# Patient Record
Sex: FEMALE | Race: ASIAN | URBAN - METROPOLITAN AREA
[De-identification: names, ages, dates, MRNs, and addresses within clinical notes are randomized per-mention and may not be internally consistent; named-entity substitution may affect disease eponyms.]

---

## 2017-01-16 ENCOUNTER — OFFICE VISIT (OUTPATIENT)
Dept: URGENT CARE | Facility: URGENT CARE | Age: 27
End: 2017-01-16
Payer: COMMERCIAL

## 2017-01-16 VITALS — DIASTOLIC BLOOD PRESSURE: 68 MMHG | SYSTOLIC BLOOD PRESSURE: 116 MMHG | OXYGEN SATURATION: 97 % | HEART RATE: 101 BPM

## 2017-01-16 DIAGNOSIS — S61.219A FINGER LACERATION, INITIAL ENCOUNTER: Primary | ICD-10-CM

## 2017-01-16 PROCEDURE — 99213 OFFICE O/P EST LOW 20 MIN: CPT | Performed by: FAMILY MEDICINE

## 2017-01-16 NOTE — PROGRESS NOTES
SUBJECTIVE:  Annette Davis is a 26 year old female complains of laceration of her right thumb. She did this last evening using a mandolin. Did not take ibuprofen for minimal discomfort that she is having. Bleeding is stopped    OBJECTIVE:  /68 mmHg  Pulse 101  SpO2 97%  Exam; examination shows a complete avulsion of tissue with some removal of the distal end of the nail right thumb into the pulp. No fat is evident underneath the skin.     ASSESSMENT:  1. Laceration with complete avulsion of tissue. I suspect that this will heal well with no evidence of infection not to substantial amount of tissue that was avulsed.     PLAN:  1. Gelfoam was placed on the wound she will do daily dressing changes and have this reevaluated in 1 week.  2. Given instructions on what to look for as far as infection is concerned

## 2017-01-16 NOTE — NURSING NOTE
Chief Complaint   Patient presents with     Urgent Care     Laceration     right side thumb cooking yesterday at 6:30-7pm.  pt reports last tetanus was w/in 10 years       Initial /68 mmHg  Pulse 101  SpO2 97% There is no height or weight on file to calculate BMI.  BP completed using cuff size: stewart Brumfield CMA                                1/16/2017 11:43 AM

## 2017-01-23 ENCOUNTER — OFFICE VISIT (OUTPATIENT)
Dept: URGENT CARE | Facility: URGENT CARE | Age: 27
End: 2017-01-23
Payer: COMMERCIAL

## 2017-01-23 VITALS
SYSTOLIC BLOOD PRESSURE: 98 MMHG | OXYGEN SATURATION: 98 % | DIASTOLIC BLOOD PRESSURE: 70 MMHG | HEART RATE: 80 BPM | TEMPERATURE: 98.1 F | WEIGHT: 143 LBS

## 2017-01-23 DIAGNOSIS — S61.219D FINGER LACERATION, SUBSEQUENT ENCOUNTER: Primary | ICD-10-CM

## 2017-01-23 PROCEDURE — 99212 OFFICE O/P EST SF 10 MIN: CPT | Performed by: FAMILY MEDICINE

## 2017-01-23 NOTE — NURSING NOTE
Chief Complaint   Patient presents with     Urgent Care     Laceration     Finger laceration 1 week ago. Dr. Zuleta recommended she come back to  in 1 week for f/u       Initial BP 98/70 mmHg  Pulse 80  Temp(Src) 98.1  F (36.7  C) (Tympanic)  Wt 143 lb (64.864 kg)  SpO2 98% There is no height on file to calculate BMI.  BP completed using cuff size: AR Clifford

## 2017-01-24 NOTE — PROGRESS NOTES
SUBJECTIVE:     Chief Complaint   Patient presents with     Urgent Care     Laceration     Finger laceration 1 week ago. Dr. Zuleta recommended she come back to  in 1 week for f/u     Annette Davis is a 26 year old female who presents to the clinic with a laceration on the right index finger sustained 1 week(s) ago.  This is a accidental injury.    Mechanism of injury: knife.    Associated symptoms: Denies numbness, weakness, or loss of function  Last tetanus booster within 10 years: yes    EXAM:   The patient appears today in alert,no apparent distress distress  VITALS: BP 98/70 mmHg  Pulse 80  Temp(Src) 98.1  F (36.7  C) (Tympanic)  Wt 143 lb (64.864 kg)  SpO2 98%    Size of laceration: 1 centimeters, R index finger  Characteristics of the laceration: covered by surgifoam  Tendon function intact: yes  Sensation to light touch intact: not asked  Pulses intact: yes  Picture included in patient's chart: no    Assessment:  Finger laceration, L    PLAN:  Ok to keep uncovered for now.   Gentle soap and water    See back in one week or sooner prn worsneing

## 2018-11-07 ENCOUNTER — TELEPHONE (OUTPATIENT)
Dept: OBGYN | Facility: CLINIC | Age: 28
End: 2018-11-07

## 2018-11-08 NOTE — TELEPHONE ENCOUNTER
Reason for call:  Other   Patient called regarding (reason for call): appointment  Additional comments: Patient would like to set up her prenatal care. She is aware that she needs to schedule an OB intake first. This is her first pregnancy. The first day of her last period was 10/04/2018. She would eventually like to see a nurse midwife. Preferred locations are [place] and [place].    Phone number to reach patient:  Cell number on file:    Telephone Information:   Mobile [phone]       Best Time:  Any time    Can we leave a detailed message on this number?  YES     Lexie ROBERT  Central Scheduler

## 2018-11-27 ENCOUNTER — PRENATAL OFFICE VISIT (OUTPATIENT)
Dept: NURSING | Facility: CLINIC | Age: 28
End: 2018-11-27
Payer: COMMERCIAL

## 2018-11-27 VITALS
HEIGHT: 65 IN | WEIGHT: 139 LBS | SYSTOLIC BLOOD PRESSURE: 96 MMHG | DIASTOLIC BLOOD PRESSURE: 60 MMHG | BODY MASS INDEX: 23.16 KG/M2 | HEART RATE: 80 BPM

## 2018-11-27 DIAGNOSIS — Z34.01 ENCOUNTER FOR SUPERVISION OF NORMAL FIRST PREGNANCY IN FIRST TRIMESTER: Primary | ICD-10-CM

## 2018-11-27 LAB
ABO + RH BLD: NORMAL
ABO + RH BLD: NORMAL
BLD GP AB SCN SERPL QL: NORMAL
BLOOD BANK CMNT PATIENT-IMP: NORMAL
ERYTHROCYTE [DISTWIDTH] IN BLOOD BY AUTOMATED COUNT: 12.7 % (ref 10–15)
HBV SURFACE AG SERPL QL IA: NONREACTIVE
HCT VFR BLD AUTO: 36.7 % (ref 35–47)
HGB BLD-MCNC: 11.8 G/DL (ref 11.7–15.7)
HIV 1+2 AB+HIV1 P24 AG SERPL QL IA: NONREACTIVE
MCH RBC QN AUTO: 28.2 PG (ref 26.5–33)
MCHC RBC AUTO-ENTMCNC: 32.2 G/DL (ref 31.5–36.5)
MCV RBC AUTO: 88 FL (ref 78–100)
PLATELET # BLD AUTO: 254 10E9/L (ref 150–450)
RBC # BLD AUTO: 4.18 10E12/L (ref 3.8–5.2)
SPECIMEN EXP DATE BLD: NORMAL
WBC # BLD AUTO: 6.5 10E9/L (ref 4–11)

## 2018-11-27 PROCEDURE — 87389 HIV-1 AG W/HIV-1&-2 AB AG IA: CPT | Performed by: ADVANCED PRACTICE MIDWIFE

## 2018-11-27 PROCEDURE — 86901 BLOOD TYPING SEROLOGIC RH(D): CPT | Performed by: ADVANCED PRACTICE MIDWIFE

## 2018-11-27 PROCEDURE — 87340 HEPATITIS B SURFACE AG IA: CPT | Performed by: ADVANCED PRACTICE MIDWIFE

## 2018-11-27 PROCEDURE — 36415 COLL VENOUS BLD VENIPUNCTURE: CPT | Performed by: ADVANCED PRACTICE MIDWIFE

## 2018-11-27 PROCEDURE — 86850 RBC ANTIBODY SCREEN: CPT | Performed by: ADVANCED PRACTICE MIDWIFE

## 2018-11-27 PROCEDURE — 86762 RUBELLA ANTIBODY: CPT | Performed by: ADVANCED PRACTICE MIDWIFE

## 2018-11-27 PROCEDURE — 86780 TREPONEMA PALLIDUM: CPT | Performed by: ADVANCED PRACTICE MIDWIFE

## 2018-11-27 PROCEDURE — 87086 URINE CULTURE/COLONY COUNT: CPT | Performed by: ADVANCED PRACTICE MIDWIFE

## 2018-11-27 PROCEDURE — 99207 ZZC NO CHARGE NURSE ONLY: CPT

## 2018-11-27 PROCEDURE — 85027 COMPLETE CBC AUTOMATED: CPT | Performed by: ADVANCED PRACTICE MIDWIFE

## 2018-11-27 PROCEDURE — 86900 BLOOD TYPING SEROLOGIC ABO: CPT | Performed by: ADVANCED PRACTICE MIDWIFE

## 2018-11-27 RX ORDER — PRENATAL VIT/IRON FUM/FOLIC AC 27MG-0.8MG
1 TABLET ORAL DAILY
Qty: 100 TABLET | Refills: 3
Start: 2018-11-27 | End: 2018-12-17 | Stop reason: ALTCHOICE

## 2018-11-27 NOTE — PROGRESS NOTES
"Chief Complaint   Patient presents with     Prenatal Care     New Prenatal Nurse Visit   7w5d  Estimated Date of Delivery: Jul 11, 2019      Initial BP 96/60 (BP Location: Right arm, Cuff Size: Adult Regular)  Pulse 80  Ht 5' 4.5\" (1.638 m)  Wt 139 lb (63 kg)  LMP 10/04/2018  BMI 23.49 kg/m2 Estimated body mass index is 23.49 kg/(m^2) as calculated from the following:    Height as of this encounter: 5' 4.5\" (1.638 m).    Weight as of this encounter: 139 lb (63 kg).  BP completed using cuff size: regular    Patient is accompanied by . Prenatal book and folder (containing standard educational hand-outs and brochures) given to patient. Information in folder reviewed. Questions answered.     Discussed and gave written information on options available for 1st and 2nd trimester screening, CVS, amniocentesis, AFP, and QUAD screen plus optimal time-frame for testing. Brochure given on optional screening available to determine Cystic Fibrosis carrier status. Pt instructed to check with insurance regarding coverage of these optional tests. Pt advised to call back if she desires testing or has any questions or concerns.    Prenatal labs obtained. New prenatal visit scheduled on 12/17/18 with Dunia Castellanos.  Tarah Guallpa RN                 "

## 2018-11-27 NOTE — MR AVS SNAPSHOT
After Visit Summary   11/27/2018    Annette Davis    MRN: [id number]           Patient Information     Date Of Birth          1990        Visit Information        Provider Department      11/27/2018 9:30 AM EA PRENATAL NURSE [facility]        Today's Diagnoses     Encounter for supervision of normal first pregnancy in first trimester    -  1       Follow-ups after your visit        Your next 10 appointments already scheduled     Dec 04, 2018  9:00 AM CST   Ultrasound with RIUS1   [facility]    [address]  [address]  [facility] [id number] [phone]            Dec 17, 2018  6:00 PM CST   New Prenatal with APPLE Shea CNM   [facility] ([facility])    [address]  [address]  [facility] [address] [phone]              Future tests that were ordered for you today     Open Future Orders        Priority Expected Expires Ordered    US OB < 14 weeks, single,  for dating ([id number]) Routine  2/25/2019 11/27/2018            Who to contact     If you have questions or need follow up information about today's clinic visit or your schedule please contact [facility] directly at [phone].  Normal or non-critical lab and imaging results will be communicated to you by DataTorrenthart, letter or phone within 4 business days after the clinic has received the results. If you do not hear from us within 7 days, please contact the clinic through DataTorrenthart or phone. If you have a critical or abnormal lab result, we will notify you by phone as soon as possible.  Submit refill requests through Globitel or call your pharmacy and they will forward the refill request to us. Please allow 3 business days for your refill to be completed.          Additional Information About Your Visit        Globitel Information     Globitel lets you send messages to your doctor, view your test results,  "renew your prescriptions, schedule appointments and more. To sign up, go to [email or web address] . Click on \"Log in\" on the left side of the screen, which will take you to the Welcome page. Then click on \"Sign up Now\" on the right side of the page.     You will be asked to enter the access code listed below, as well as some personal information. Please follow the directions to create your username and password.     Your access code is: [email or web address]  Expires: 2019 11:47 AM     Your access code will  in 90 days. If you need help or a new code, please call your [facility] clinic or [phone].        Care EveryWhere ID     This is your Care EveryWhere ID. This could be used by other organizations to access your [facility] [place] medical records  [id number]        Your Vitals Were     Pulse Height Last Period BMI (Body Mass Index)          80 5' 4.5\" (1.638 m) 10/04/2018 23.49 kg/m2         Blood Pressure from Last 3 Encounters:   18 96/60   17 98/70   17 116/68    Weight from Last 3 Encounters:   18 139 lb (63 kg)   17 143 lb (64.9 kg)              We Performed the Following     ABO/Rh type and screen     CBC with platelets     Hepatitis B surface antigen     HIV Antigen Antibody Combo     Rubella Antibody IgG Quantitative     Treponema Abs w Reflex to RPR and Titer     Urine Culture Aerobic Bacterial          Today's Medication Changes          These changes are accurate as of 18 11:47 AM.  If you have any questions, ask your nurse or doctor.               Start taking these medicines.        Dose/Directions    pediatric multivitamin w/iron 27-0.8 MG tablet   Used for:  Encounter for supervision of normal first pregnancy in first trimester        Dose:  1 tablet   Take 1 tablet by mouth daily   Quantity:  100 tablet   Refills:  3            Where to get your medicines      Some of these will need a paper prescription and others can be bought over the counter.  Ask your nurse " if you have questions.     You don't need a prescription for these medications     pediatric multivitamin w/iron 27-0.8 MG tablet                Primary Care Provider Fax #    Physician No Ref-Primary [phone]       No address on file        Equal Access to Services     JUNO TRENT : Hadii aad ku hadjonas Jacinto, wageovannada luqadaha, qaybta kaalmada dani, dima conrad gadiel casillas. So [facility] [phone].    ATENCIÓN: Si habla español, tiene a cortes disposición servicios gratuitos de asistencia lingüística. Llame al [phone].    We comply with applicable federal civil rights laws and [place] laws. We do not discriminate on the basis of race, color, national origin, age, disability, sex, sexual orientation, or gender identity.            Thank you!     Thank you for choosing [facility]  for your care. Our goal is always to provide you with excellent care. Hearing back from our patients is one way we can continue to improve our services. Please take a few minutes to complete the written survey that you may receive in the mail after your visit with us. Thank you!             Your Updated Medication List - Protect others around you: Learn how to safely use, store and throw away your medicines at www.disposemymeds.org.          This list is accurate as of 11/27/18 11:47 AM.  Always use your most recent med list.                   Brand Name Dispense Instructions for use Diagnosis    pediatric multivitamin w/iron 27-0.8 MG tablet     100 tablet    Take 1 tablet by mouth daily    Encounter for supervision of normal first pregnancy in first trimester

## 2018-11-28 LAB
BACTERIA SPEC CULT: NO GROWTH
RUBV IGG SERPL IA-ACNC: 28 IU/ML
SPECIMEN SOURCE: NORMAL
T PALLIDUM AB SER QL: NONREACTIVE

## 2018-12-02 ENCOUNTER — MYC MEDICAL ADVICE (OUTPATIENT)
Dept: OBGYN | Facility: CLINIC | Age: 28
End: 2018-12-02

## 2018-12-03 NOTE — TELEPHONE ENCOUNTER
Hemoglobin   Date Value Ref Range Status   11/27/2018 11.8 11.7 - 15.7 g/dL Final   ]please see my chart message and advise.  Tarah Guallpa RN

## 2018-12-04 ENCOUNTER — HEALTH MAINTENANCE LETTER (OUTPATIENT)
Age: 28
End: 2018-12-04

## 2018-12-04 DIAGNOSIS — Z34.01 ENCOUNTER FOR SUPERVISION OF NORMAL FIRST PREGNANCY IN FIRST TRIMESTER: ICD-10-CM

## 2018-12-17 ENCOUNTER — PRENATAL OFFICE VISIT (OUTPATIENT)
Dept: OBGYN | Facility: CLINIC | Age: 28
End: 2018-12-17
Payer: COMMERCIAL

## 2018-12-17 VITALS — SYSTOLIC BLOOD PRESSURE: 110 MMHG | WEIGHT: 138.6 LBS | BODY MASS INDEX: 23.42 KG/M2 | DIASTOLIC BLOOD PRESSURE: 60 MMHG

## 2018-12-17 DIAGNOSIS — Z34.01 ENCOUNTER FOR SUPERVISION OF NORMAL FIRST PREGNANCY IN FIRST TRIMESTER: Primary | ICD-10-CM

## 2018-12-17 PROCEDURE — 99207 ZZC PRENATAL VISIT: CPT | Performed by: ADVANCED PRACTICE MIDWIFE

## 2018-12-17 NOTE — PROGRESS NOTES
Annette Davis is a 28 year old  [place],  who is not a previous CNM patient. She presents for a new OB Visit. This was a planned pregnancy.     FOB is  who is in good health.  FOB IS actively involved in relationship and this pregnancy.       She has had bleeding since her LMP.  The bleeding  was pinkish, in small, on x1 occasions, and was not accompanied by cramping.  She denies  abdominal pain since her LMP.  She has had nausea.  has not had vomiting.  Any personal or family history of blood clots? No  History of sickle cell anemia or trait? No         Patient's last menstrual period was 10/04/2018..  Estimated Date of Delivery: 2019 Ultrasound consistent with LMP.    MENSTRUAL HISTORY    frequency: every 28-30 days  Last PAP: >5 years ago, declines today, patient has pelvic floor disorder that makes pelvic exams very uncomfortable   History of abnormal Pap?  No    Health maintenance updated:  yes        Current medications are:    Current Outpatient Medications:      Prenatal Vit-Fe Fumarate-FA (PEDIATRIC MULTIVITAMIN W/IRON) 27-0.8 MG tablet, Take 1 tablet by mouth daily, Disp: 100 tablet, Rfl: 3     INFECTION HISTORY  HIV: No  Hepatitis B: No  Hepatitis C: No  Tuberculosis: No   Genital Herpes self: no  Herpes partner:  no  Chlamydia:  no  Gonorrhea:  no  HPV: No  BV:  No  Syphilis:  No  Chicken Pox:  Yes - has had       OB HISTORY  Obstetric History       T0      L0     SAB0   TAB0   Ectopic0   Multiple0   Live Births0       # Outcome Date GA Lbr Balaji/2nd Weight Sex Delivery Anes PTL Lv   1 Current                   History of GDM: No,  PTL : No,  History of HTN in pregnancy: No,  Thrombocytopenia: No,  Shoulder dystocia: No,  Vacuum Extraction: No  PPH: No   3rd of 4th degree laceration: No.   Other complications: No    PERSONAL HISTORY  Exercise Habits:  none irregularly days per week.  Employment: Full time.  Her job involves light activity with little potential for toxic  exposure.    Travel plans:  are international travel. [place] in Feb non Zika country.   Diet: follows a balanced nutrition diet  Abuse concerns? No  Hgb A1c screen:  BMI > 30: No, 1st degree family DM: No, History of GDM: No, PCOS: No, High risk ethnicity: No    Social History     Socioeconomic History     Marital status:      Spouse name: Dylan     Number of children: Not on file     Years of education: 18     Highest education level: Not on file   Social Needs     Financial resource strain: Not on file     Food insecurity - worry: Not on file     Food insecurity - inability: Not on file     Transportation needs - medical: Not on file     Transportation needs - non-medical: Not on file   Occupational History     Occupation: youth counselor     Employer: [employer]   Tobacco Use     Smoking status: Never Smoker     Smokeless tobacco: Never Used   Substance and Sexual Activity     Alcohol use: No     Alcohol/week: 0.0 oz     Drug use: No     Sexual activity: Yes     Partners: Male   Other Topics Concern     Not on file   Social History Narrative     Not on file       She  reports that  has never smoked. she has never used smokeless tobacco.    STD testing offered?  Declined  Last PHQ-9 score on record = No flowsheet data found.  Last GAD7 score on record = No flowsheet data found.  Alcohol Score = no  Referral/Meds needed? no    PAST MEDICAL/SURGICAL HISTORY  Past Medical History:   Diagnosis Date     Pelvic floor dysfunction      Past Surgical History:   Procedure Laterality Date     EXTRACTION(S) DENTAL         FAMILY HISTORY  Family History   Problem Relation Age of Onset     Hypertension Father      Hypertension Paternal Grandmother        ROS:  12 point review of systems negative other than symptoms noted below.      PHYSICAL EXAM  Vitals: LMP 10/04/2018   BMI= There is no height or weight on file to calculate BMI.     GENERAL:  28 year old pleasant pregnant female, alert, cooperative and well groomed.  NECK:   Thyroid without enlargement and nodules.  Lymph nodes not palpable.   LUNGS:  Clear to auscultation.  HEART:  RRR without murmur.  ABDOMEN: Soft without masses or tenderness.  No scars noted..  GENITALIA: deferred per patient request     UTERUS:  nontender 10 weeks in size.  ADNEXA: Without masses or tenderness  RECTAL:  Normal appearance.  Digital exam deferred.  LOWER EXTREMITIES: No edema. No significant varicosities.    ASSESSMENT/PLAN:    IUP at 10w4d  (Z34.01) Encounter for supervision of normal first pregnancy in first trimester  (primary encounter diagnosis)  Comment: wnl  Plan: return to clinic 4 weeks        consult for US for AMA patients: NA  Genetic Testing reviewed and discussed, patient declines    COUNSELING    Instructed on use of triage nurse line and contacting the on call CNM after hours in an emergency.     Symptoms of N&V and fatigue usually start to resolve around 12-16 weeks     Reviewed CNM philosophy, call schedule for labor and delivery, and [facility] for delivery    1st OB handout given outlining appointment spacing and CNM information    Reviewed exercise and nutrition    Recommend to gain 25-35 pounds with her pregnancy.    Discussed OTC medications. OB med list given    Encouraged patient to arrange  if needed    Encouraged patient to take PNV's/DHA    Travel precautions discussed, no air travel after 36 weeks and Zika Virus discussed    Will call patient with lab results when available      F/U to be addressed next visit:  4 weeks, Rx's given, referrals, patient declines pelvic floor PT at this time, states she has uncontrollable pelvic floor that makes it difficult for BM and painful intercourse at times. Has not seen pelvic floor PT.  Was diagnosed at [place], possible Vaginismus.  Discussed options, including scheduled c/s patient and  to discuss best options, plans for epidural.     Will return to the clinic in 4 weeks for her next routine prenatal check.  Will call  to be seen sooner if problems arise.    APPLE Mason, CNM

## 2018-12-17 NOTE — NURSING NOTE
"Chief Complaint   Patient presents with     Prenatal Care   10w4d   Here today with Dylan Davies GD and sweet tooth    Initial /60   Wt 62.9 kg (138 lb 9.6 oz)   LMP 10/04/2018   BMI 23.42 kg/m   Estimated body mass index is 23.42 kg/m  as calculated from the following:    Height as of 18: 1.638 m (5' 4.5\").    Weight as of this encounter: 62.9 kg (138 lb 9.6 oz).  BP completed using cuff size: regular    Questioned patient about current smoking habits.  Pt. has never smoked.          The following HM Due: Vaccinations: flu shot and pap   Declines flu shot today    Lashawn Gomez CMA               "

## 2018-12-25 ENCOUNTER — NURSE TRIAGE (OUTPATIENT)
Dept: NURSING | Facility: CLINIC | Age: 28
End: 2018-12-25

## 2018-12-25 ENCOUNTER — TELEPHONE (OUTPATIENT)
Dept: OBGYN | Facility: CLINIC | Age: 28
End: 2018-12-25

## 2018-12-25 NOTE — TELEPHONE ENCOUNTER
"\"I'm almost 3 months pregnant and I started to bleed this morning\".  Caller reports that bleeding happened right after intercourse.    Caller has a subchorionic hemorrhage noted on her recent US.    Paged on call Midwife for [facility] to speak to caller at [phone].  Midwife Emanuel is on call, page sent @ 10:20 am via smart web.    Caller advised to call back if they have not heard back from on call provider within 20 minutes.  Caller appears to understand directives and agrees with plan.  Reason for Disposition    MILD vaginal bleeding (i.e., clots or similar to menstrual period; not just spotting)    Additional Information    Negative: Shock suspected (e.g., cold/pale/clammy skin, too weak to stand, low BP, rapid pulse)    Negative: Difficult to awaken or acting confused  (e.g., disoriented, slurred speech)    Negative: Passed out (i.e., lost consciousness, collapsed and was not responding)    Negative: Sounds like a life-threatening emergency to the triager    Negative: [1] Vaginal bleeding AND [2] pregnant > 20 weeks    Negative: Not pregnant or pregnancy status unknown    Negative: SEVERE abdominal pain    Negative: [1] SEVERE vaginal bleeding (i.e., soaking 2 pads / hour, large blood clots) AND [2] present 2 or more hours    Negative: [1] MODERATE vaginal bleeding (i.e., soaking 1 pad / hour; clots) AND [2] present > 6 hours    Negative: [1] MODERATE vaginal bleeding (i.e., soaking 1 pad / hour; clots) AND [2] pregnant > 12 weeks    Negative: Passed tissue (e.g., gray-white)    Negative: Shoulder pain    Negative: Pale skin (pallor) of new onset or worsening    Negative: Patient sounds very sick or weak to the triager    Negative: [1] Constant abdominal pain AND [2] present > 2 hours    Negative: Fever > 100.4 F (38.0 C)    Negative: [1] Intermittent lower abdominal pain (e.g., cramping) AND [2] present > 24 hours    Negative: Prior history of \"ectopic pregnancy\" or previous tubal surgery (e.g., tubal " ligation)    Negative: Burning with urination    Negative: Has IUD    Protocols used: PREGNANCY - VAGINAL BLEEDING LESS THAN 20 WEEKS EGA-ADULT-AH    Marge Mckenna, RN  [facility]

## 2018-12-25 NOTE — TELEPHONE ENCOUNTER
Patient called reporting bleeding after intercourse this morning. Reports light pink watery spotting, lasted 5-10 minutes. Was instructed to call if bleeding d/t DAWIT on early ultrasound. Reviewed warning signs of miscarriage, likely spotting from cervix. Offered to find patient appointment for tomorrow with CNM, patient states aware of symptoms to watch for, declines appointment at this time. APPLE Mason, CNM

## 2019-01-21 ENCOUNTER — PRENATAL OFFICE VISIT (OUTPATIENT)
Dept: OBGYN | Facility: CLINIC | Age: 29
End: 2019-01-21

## 2019-01-21 VITALS
SYSTOLIC BLOOD PRESSURE: 122 MMHG | BODY MASS INDEX: 23.22 KG/M2 | WEIGHT: 139.4 LBS | DIASTOLIC BLOOD PRESSURE: 80 MMHG | HEIGHT: 65 IN

## 2019-01-21 DIAGNOSIS — Z34.02 ENCOUNTER FOR SUPERVISION OF NORMAL FIRST PREGNANCY IN SECOND TRIMESTER: Primary | ICD-10-CM

## 2019-01-21 PROCEDURE — 99207 ZZC PRENATAL VISIT: CPT | Performed by: ADVANCED PRACTICE MIDWIFE

## 2019-01-21 ASSESSMENT — MIFFLIN-ST. JEOR: SCORE: 1355.25

## 2019-01-21 NOTE — PROGRESS NOTES
"S:Nausea improving, occasionally has dry throat that induces nausea. Using lozenges. Has loss of appetite. Questions about weight gain. Has intermittent dizziness when moving from sitting to standing. Denies heart palpitations, changes in vision.    Fetal movement No  Denies loss of fluid/vb/contractions/pelvic pain    O:  LMP 10/04/2018  /80   Ht 1.638 m (5' 4.5\")   Wt 63.2 kg (139 lb 6.4 oz)   LMP 10/04/2018   BMI 23.56 kg/m      Exam:  Constitutional: healthy, alert and no distress  Respiratory: Respirations even and unlabored  Gastrointestinal: Abdomen soft, non-tender. Fundus measures appropriately for gestational age. Fetal heart tones heard easily.  Psychiatric: mentation appears normal and affect normal/bright  A: (Z34.02) Encounter for supervision of normal first pregnancy in second trimester  (primary encounter diagnosis)  Comment: wnl   Plan: return to clinic 4 weeks       P: Discussed options for quad screen today  declined quad screen today  Reviewed dizziness symptoms, encouraged slow movements, increase fluids. return to clinic if experiencing heart palpitations or changes in vision. Likely physiologic   Anatomy ultrasound next visit between 20-22 weeks  Plans to transfer care due to insurance change   Reviewed normal weight gain in pregnancy   Return to clinic 4 weeks    Dunia Castellanos, BARRY, APRN, CNM          "

## 2019-01-22 NOTE — NURSING NOTE
"Chief Complaint   Patient presents with     Prenatal Care     15w0d       Initial /80   Ht 1.638 m (5' 4.5\")   Wt 63.2 kg (139 lb 6.4 oz)   LMP 10/04/2018   BMI 23.56 kg/m   Estimated body mass index is 23.56 kg/m  as calculated from the following:    Height as of this encounter: 1.638 m (5' 4.5\").    Weight as of this encounter: 63.2 kg (139 lb 6.4 oz).  BP completed using cuff size: regular    Questioned patient about current smoking habits.  Pt. has never smoked.          The following HM Due: NONE      The following patient reported/Care Every where data was sent to:  P ABSTRACT QUALITY INITIATIVES [[id number]]    Tracie Barragan MA             "

## 2019-01-22 NOTE — PATIENT INSTRUCTIONS
Healing Pelvic Pain -[facility]   [facility] [email or web address]    Weeks 14 thru 16 - Gestational Age (Fetal age - Weeks 12 thru 14):  The fetus s skin is thin and see-through. Fine hair called lanugo begins to form on the head. The fetus begins sucking and swallows bits of amniotic fluid. Fingerprints which individualize each human being have now begun to develop on the tiny fingers of the fetus. Meconium is made in the intestinal tract and will build up to be the baby s first bowel movement. Flutters may be felt in the mom s growing abdomen, as the fetus begins to move around more. Sweat glands have developed, and the liver and pancreas produce fluid secretions. The fetus has reached 6 inches in length and weighs about 4 ounces

## 2019-05-06 ENCOUNTER — TELEPHONE (OUTPATIENT)
Dept: OBGYN | Facility: CLINIC | Age: 29
End: 2019-05-06

## 2019-07-03 ENCOUNTER — TELEPHONE (OUTPATIENT)
Dept: OBGYN | Facility: CLINIC | Age: 29
End: 2019-07-03

## 2019-07-03 NOTE — TELEPHONE ENCOUNTER
Panel Management Review      Composite cancer screening  Chart review shows that this patient is due/due soon for the following Pap Smear  Summary:    Patient is due/failing the following:   PAP    Action needed:   Patient needs office visit for PapSmear.    Type of outreach:    Sent letter.    Questions for provider review:    None                                                                                                                                    Tracie Barragan MA

## 2019-07-03 NOTE — LETTER
[place] [address]  [address]  [facility] [address]  Phone: [phone]      July 3, 2019    Annette Davis  [address]  [place] [address]          Dear Annette Davis    This is a reminder that you are due for your follow-up Pap Smear.    It is important to follow up on this screening in order to detect any recurrence or continuation of abnormal cells, which could potentially progress to a cancerous condition.    You may call our office at [phone] to schedule an appointment at this clinic.    Please disregard this notice if you have had this procedure repeated or already made an appointment.        Sincerely,    Dunia Castellanos CNM

## 2019-07-03 NOTE — LETTER
[place] [address]  [address]  [facility] [address]  Phone: [phone]      July 3, 2019    Annette Davis  [address]  [place] [address]          Dear Annette Davis    This is a reminder that you are due for your follow-up Pap Smear.    It is important to follow up on this screening in order to detect any recurrence or continuation of abnormal cells, which could potentially progress to a cancerous condition.    You may call our office at [phone] to schedule an appointment at this clinic.    Please disregard this notice if you have had this procedure repeated or already made an appointment.        Sincerely,    Dunia Castellanos CNM

## 2019-07-03 NOTE — LETTER
[place] [address]  [address]  [facility] [address]  Phone: [phone]      July 3, 2019    Annette Davis  [address]  [place] [address]          Dear Annette Davis    This is a reminder that you are due for your follow-up Pap Smear.    It is important to follow up on this screening in order to detect any recurrence or continuation of abnormal cells, which could potentially progress to a cancerous condition.    You may call our office at [phone] to schedule an appointment at this clinic.    Please disregard this notice if you have had this procedure repeated or already made an appointment.        Sincerely,    Dunia Castellanos CNM

## 2020-02-13 ENCOUNTER — TELEPHONE (OUTPATIENT)
Dept: OBGYN | Facility: CLINIC | Age: 30
End: 2020-02-13

## 2020-02-13 NOTE — TELEPHONE ENCOUNTER
Panel Management Review      Composite cancer screening  Chart review shows that this patient is due/due soon for the following Pap Smear  Summary:    Patient is due/failing the following:   PAP    Action needed:   Patient needs office visit for PAP SMEAR.    Type of outreach:    Sent letter.    Questions for provider review:    None                                                                                                                                    Tracie Barragan MA       Chart closed .

## 2020-02-13 NOTE — LETTER
[place] [address]  [address]  [facility] [address]  Phone: [phone]      February 13, 2020    Annette Davis  [address]  [place] [address]          Dear Annette Davis    This is a reminder that you are due for your PAP SMEAR.      It is important to follow up on this screening in order to detect any recurrence or continuation of abnormal cells, which could potentially progress to a cancerous condition.    You may call our office at  to schedule an appointment at this clinic.    Please disregard this notice if you have had this procedure repeated or already made an appointment.        Sincerely,    Dunia Castellanos CNM